# Patient Record
Sex: MALE | Race: WHITE
[De-identification: names, ages, dates, MRNs, and addresses within clinical notes are randomized per-mention and may not be internally consistent; named-entity substitution may affect disease eponyms.]

---

## 2019-03-16 ENCOUNTER — HOSPITAL ENCOUNTER (EMERGENCY)
Dept: HOSPITAL 63 - ER | Age: 64
Discharge: HOME | End: 2019-03-16
Payer: OTHER GOVERNMENT

## 2019-03-16 VITALS — BODY MASS INDEX: 27.17 KG/M2 | HEIGHT: 69 IN | WEIGHT: 183.42 LBS

## 2019-03-16 VITALS — DIASTOLIC BLOOD PRESSURE: 67 MMHG | SYSTOLIC BLOOD PRESSURE: 126 MMHG

## 2019-03-16 DIAGNOSIS — B97.89: ICD-10-CM

## 2019-03-16 DIAGNOSIS — Z87.442: ICD-10-CM

## 2019-03-16 DIAGNOSIS — J32.0: Primary | ICD-10-CM

## 2019-03-16 PROCEDURE — 99283 EMERGENCY DEPT VISIT LOW MDM: CPT

## 2019-03-16 NOTE — PHYS DOC
Past History


Past Medical History:  Kidney Stones, Other


Past Surgical History:  Other


Alcohol Use:  Rarely


Drug Use:  None





Adult General


Chief Complaint


Chief Complaint:  sinus pressure





HPI


HPI





Patient is a 63 year old male who presents with complaint of bilateral sinus 

pressure.  Patient has had congestion over the past 5 days.  Notes previous 

history of chronic sinusitis symptoms.  Patient states that he has had 

bilateral maxillary sinus surgery 9 months ago but has started to have 

recurrent sinus congestion off and on over the past 3 months.  Has been using 

oral decongestants and Mucinex with no significant relief of symptoms.  Denies 

any fever.  Has used Afrin at night time to help with sleep over the past 5 

nights.  States symptoms improved during the day but are worse at nighttime.  

Denies any cough, shortness of breath.  Has had postnasal drip and sore throat.





Review of Systems


Review of Systems





Constitutional: Denies fever or chills []


Eyes: Denies change in visual acuity, redness, or eye pain []


HENT: Nasal congestion, sinus pressure, sore throat, postnasal drip[]


Respiratory: Denies cough or shortness of breath []


Cardiovascular: Denies chest pain or edema[]


GI: Denies abdominal pain, nausea, vomiting, bloody stools or diarrhea []


: Denies dysuria or hematuria []


Musculoskeletal: Denies back pain or joint pain []


Integument: Denies rash or skin lesions []


Neurologic: Denies headache, focal weakness or sensory changes []








All other systems were reviewed and found to be within normal limits, except as 

documented in this note.





Allergies


Allergies





Allergies








Coded Allergies Type Severity Reaction Last Updated Verified


 


  No Known Drug Allergies    6/16/16 No











Physical Exam


Physical Exam





Constitutional: Well developed, well nourished, no acute distress, non-toxic 

appearance. []


HENT: Normocephalic, atraumatic, bilateral external ears normal, oropharynx 

moist, no oral exudates, nasal mucosal dryness bilaterally, mild nasal mucosal 

edema, maxillary sinuses nontender on palpation. []


Eyes: PERRLA, EOMI, conjunctiva normal, no discharge. [] 


Neck: Normal range of motion, no tenderness, supple, no stridor. [] 


Cardiovascular:Heart rate regular rhythm, no murmur []


Lungs & Thorax:  Bilateral breath sounds clear to auscultation []


Abdomen: Bowel sounds normal, soft, no tenderness, no masses, no pulsatile 

masses. [] 


Skin: Warm, dry, no erythema, no rash. [] 


Back: No tenderness, no CVA tenderness. [] 


Extremities: No tenderness, no cyanosis, no clubbing, ROM intact, no edema. [] 


Neurologic: Alert and oriented X 3, normal motor function, normal sensory 

function, no focal deficits noted. []





Current Patient Data


Vital Signs





 Vital Signs








  Date Time  Temp Pulse Resp B/P (MAP) Pulse Ox O2 Delivery O2 Flow Rate FiO2


 


3/16/19 08:49      Room Air  


 


3/16/19 08:49 97.6 66 20  96   








Lab Results


Not performed





EKG


EKG


Not performed[]





Radiology/Procedures


Radiology/Procedures


Not performed[]





Course & Med Decision Making


Course & Med Decision Making


Pertinent Labs and Imaging studies reviewed. (See chart for details)





Patient's exam consistent with nonbacterial bilateral maxillary sinusitis.  At 

this time antibiotic therapy is not indicated.  Advised use of Flonase nasal 

spray and use of Claritin daily to add current regimen.  Advised to discontinue 

use of Afrin and advised limited dose of oral decongestants.  Recommended use 

of warm moist air and nighttime humidifier for continued treatment of 

congestion.  Recommended follow-up in 3-5 days with primary doctor for 

reevaluation and return to emergency department for any worsening symptoms.  

Patient was understanding and in agreement with treatment plan.[]





Dragon Disclaimer


Dragon Disclaimer


This electronic medical record was generated, in whole or in part, using a 

voice recognition dictation system.





Departure


Departure:


Impression:  


 Primary Impression:  


 Viral sinusitis


Disposition:  01 HOME, SELF-CARE


Condition:  STABLE


Referrals:  


LAKESHA RENDON DO (PCP)


Patient Instructions:  Sinusitis





Additional Instructions:  


Follow up with your primary doctor in 5 days for reevaluation. Return to the 

emergency department for  any worsening symptoms.


Scripts


Fluticasone Propionate (Flonase Allergy Relief) 9.9 Ml [place].susp


2 SPRAYS NS DAILY for 30 Days, BOTTLE


   Prov: TOSHIA STANTON MD         3/16/19











TOSHIA STANTON MD Mar 16, 2019 09:16

## 2020-12-12 ENCOUNTER — HOSPITAL ENCOUNTER (EMERGENCY)
Dept: HOSPITAL 63 - ER | Age: 65
Discharge: HOME | End: 2020-12-12
Payer: OTHER GOVERNMENT

## 2020-12-12 VITALS — BODY MASS INDEX: 26.71 KG/M2 | WEIGHT: 180.34 LBS | HEIGHT: 69 IN

## 2020-12-12 VITALS
SYSTOLIC BLOOD PRESSURE: 143 MMHG | DIASTOLIC BLOOD PRESSURE: 87 MMHG | DIASTOLIC BLOOD PRESSURE: 87 MMHG | SYSTOLIC BLOOD PRESSURE: 143 MMHG

## 2020-12-12 DIAGNOSIS — R00.1: ICD-10-CM

## 2020-12-12 DIAGNOSIS — G89.29: ICD-10-CM

## 2020-12-12 DIAGNOSIS — Z87.442: ICD-10-CM

## 2020-12-12 DIAGNOSIS — N20.0: Primary | ICD-10-CM

## 2020-12-12 LAB
ALBUMIN SERPL-MCNC: 3.8 G/DL (ref 3.4–5)
ALP SERPL-CCNC: 74 U/L (ref 46–116)
ALT SERPL-CCNC: 38 U/L (ref 16–63)
ANION GAP SERPL CALC-SCNC: 8 MMOL/L (ref 6–14)
APTT PPP: YELLOW S
AST SERPL-CCNC: 25 U/L (ref 15–37)
BACTERIA #/AREA URNS HPF: 0 /HPF
BASOPHILS # BLD AUTO: 0 X10^3/UL (ref 0–0.2)
BASOPHILS NFR BLD: 1 % (ref 0–3)
BILIRUB DIRECT SERPL-MCNC: 0.1 MG/DL (ref 0–0.2)
BILIRUB SERPL-MCNC: 0.3 MG/DL (ref 0.2–1)
BILIRUB UR QL STRIP: (no result)
CA-I SERPL ISE-MCNC: 21 MG/DL (ref 8–26)
CALCIUM SERPL-MCNC: 8.6 MG/DL (ref 8.5–10.1)
CHLORIDE SERPL-SCNC: 107 MMOL/L (ref 98–107)
CO2 SERPL-SCNC: 27 MMOL/L (ref 21–32)
CREAT SERPL-MCNC: 1.2 MG/DL (ref 0.7–1.3)
EOSINOPHIL NFR BLD: 0.5 X10^3/UL (ref 0–0.7)
EOSINOPHIL NFR BLD: 6 % (ref 0–3)
ERYTHROCYTE [DISTWIDTH] IN BLOOD BY AUTOMATED COUNT: 13.2 % (ref 11.5–14.5)
FIBRINOGEN PPP-MCNC: (no result) MG/DL
GFR SERPLBLD BASED ON 1.73 SQ M-ARVRAT: 61 ML/MIN
GLUCOSE SERPL-MCNC: 120 MG/DL (ref 70–99)
GLUCOSE UR STRIP-MCNC: (no result) MG/DL
HCT VFR BLD CALC: 48.2 % (ref 39–53)
HGB BLD-MCNC: 16.1 G/DL (ref 13–17.5)
LIPASE: 99 U/L (ref 73–393)
LYMPHOCYTES # BLD: 2.1 X10^3/UL (ref 1–4.8)
LYMPHOCYTES NFR BLD AUTO: 27 % (ref 24–48)
MCH RBC QN AUTO: 30 PG (ref 25–35)
MCHC RBC AUTO-ENTMCNC: 34 G/DL (ref 31–37)
MCV RBC AUTO: 91 FL (ref 79–100)
MONO #: 0.7 X10^3/UL (ref 0–1.1)
MONOCYTES NFR BLD: 9 % (ref 0–9)
NEUT #: 4.5 X10^3UL (ref 1.8–7.7)
NEUTROPHILS NFR BLD AUTO: 57 % (ref 31–73)
NITRITE UR QL STRIP: (no result)
PLATELET # BLD AUTO: 219 X10^3/UL (ref 140–400)
POTASSIUM SERPL-SCNC: 3.7 MMOL/L (ref 3.5–5.1)
PROT SERPL-MCNC: 7.4 G/DL (ref 6.4–8.2)
RBC # BLD AUTO: 5.31 X10^6/UL (ref 4.3–5.7)
RBC #/AREA URNS HPF: 0 /HPF (ref 0–2)
SODIUM SERPL-SCNC: 142 MMOL/L (ref 136–145)
SP GR UR STRIP: >=1.03
SQUAMOUS #/AREA URNS LPF: (no result) /LPF
UROBILINOGEN UR-MCNC: 0.2 MG/DL
WBC # BLD AUTO: 7.9 X10^3/UL (ref 4–11)
WBC #/AREA URNS HPF: 0 /HPF (ref 0–4)

## 2020-12-12 PROCEDURE — 80048 BASIC METABOLIC PNL TOTAL CA: CPT

## 2020-12-12 PROCEDURE — 80076 HEPATIC FUNCTION PANEL: CPT

## 2020-12-12 PROCEDURE — 81001 URINALYSIS AUTO W/SCOPE: CPT

## 2020-12-12 PROCEDURE — 99285 EMERGENCY DEPT VISIT HI MDM: CPT

## 2020-12-12 PROCEDURE — 85730 THROMBOPLASTIN TIME PARTIAL: CPT

## 2020-12-12 PROCEDURE — 85610 PROTHROMBIN TIME: CPT

## 2020-12-12 PROCEDURE — 83690 ASSAY OF LIPASE: CPT

## 2020-12-12 PROCEDURE — 74176 CT ABD & PELVIS W/O CONTRAST: CPT

## 2020-12-12 PROCEDURE — 74022 RADEX COMPL AQT ABD SERIES: CPT

## 2020-12-12 PROCEDURE — 96374 THER/PROPH/DIAG INJ IV PUSH: CPT

## 2020-12-12 PROCEDURE — 96361 HYDRATE IV INFUSION ADD-ON: CPT

## 2020-12-12 PROCEDURE — 96375 TX/PRO/DX INJ NEW DRUG ADDON: CPT

## 2020-12-12 PROCEDURE — 36415 COLL VENOUS BLD VENIPUNCTURE: CPT

## 2020-12-12 PROCEDURE — 82550 ASSAY OF CK (CPK): CPT

## 2020-12-12 PROCEDURE — 85025 COMPLETE CBC W/AUTO DIFF WBC: CPT

## 2020-12-12 NOTE — PHYS DOC
Past History


Past Medical History:  Kidney Stones, Other


 (AMY HUGO MD)


Past Surgical History:  Other


 (AMY HUGO MD)


Alcohol Use:  Rarely


Drug Use:  None


 (AMY HUGO MD)





General Adult


HPI:


HPI:


".. I am having another kidneystone...this is my fourth one... It been hurting 

the last 8 hrs.. right side... I ve had  stents.. .. basket.. and lithotripsy...

This happens about every 5 years... " " I like that Toradol drug.... And that 

Demerol drug.. Do not like that morphine drug.. "





Patient is a 65. year old male who presents with above hx and complaints of 

kidney stone pain right flank for the past 8 hours.  Is having associated 

nausea.  Patient is retired .


 (AMY HUGO MD)





Review of Systems:


Review of Systems:


Constitutional:  Denies fever or chills 


Eyes:  Denies change in visual acuity 


HENT:  Denies nasal congestion or sore throat 


Respiratory:  Denies cough or shortness of breath 


Cardiovascular:  Denies chest pain or edema 


GI:  Denies abdominal pain, nausea, vomiting, bloody stools or diarrhea 


: Denies dysuria 


Musculoskeletal:  Denies back pain or joint pain 


Integument:  Denies rash 


Neurologic:  Denies headache, focal weakness or sensory changes 


Endocrine:  Denies polyuria or polydipsia 


Lymphatic:  Denies swollen glands 


Psychiatric:  Denies depression or anxiety


 (AMY HUGO MD)





Allergies:


Allergies:





Allergies








Coded Allergies Type Severity Reaction Last Updated Verified


 


  No Known Drug Allergies    16 No








 (AMY HUGO MD)





Physical Exam:


PE:





Constitutional: Well developed, well nourished, no acute distress, non-toxic 

appearance. []


HENT: Normocephalic, atraumatic, bilateral external ears normal, oropharynx 

moist, no oral exudates, nose normal. []


Eyes: PERRLA, EOMI, conjunctiva normal, no discharge. [] 


Neck: Normal range of motion, no tenderness, supple, no stridor. [] 


Cardiovascular:Heart rate regular rhythm, no murmur []


Lungs & Thorax:  Bilateral breath sounds clear to auscultation []


Abdomen: Bowel sounds normal, soft, no tenderness, no masses, no pulsatile 

masses. [] 


Skin: Warm, dry, no erythema, no rash. [] 


Back: No tenderness, no CVA tenderness. [] 


Extremities: No tenderness, no cyanosis, no clubbing, ROM intact, no edema. [] 


Neurologic: Alert and oriented X 3, normal motor function, normal sensory 

function, no focal deficits noted. []


Psychologic: Affect normal, judgement normal, mood normal. []


 (AMY HUGO MD)





EKG:


EKG:


[]


 (AMY HUGO MD)





Radiology/Procedures:


Radiology/Procedures:


[]


 (AMY HUGO MD)


Radiology/Procedures:


                                 IMAGING REPORT





                                     Signed





PATIENT: LAKESHA SEAY  ACCOUNT: [id number]     MRN#: [id number]


: 1955           LOCATION: ER              AGE: 65


SEX: M                    EXAM DT: 20         ACCESSION#: [id number]


STATUS: REG ER            ORD. PHYSICIAN: AMY HUGO MD


REASON: Rt. flank pain


PROCEDURE: ACUTE ABDOMEN SERIES





EXAMINATION: XR ABDOMEN COMP ACUTE  





CLINICAL HISTORY: Right flank pain





TECHNIQUE: XR ABDOMEN COMP ACUTE





COMPARISON: None








FINDINGS:  





Nonspecific nonobstructive bowel gas pattern extending to the rectum. Stool 

throughout the colon. Small left renal calculus. No right renal calculus 

visualized.





Normal heart size. No evidence of focal airspace consolidation, pleural 

effusion, or pneumothorax.





Multilevel thoracolumbar degenerative changes. Suture anchors in the right 

shoulder.








IMPRESSION:  





No evidence of acute abdominopelvic or cardiopulmonary abnormality.





Small left renal calculus.





Stool throughout the colon.








Electronically signed by: Dariusz Solano DO (2020 8:02 AM) [facility]














DICTATED AND SIGNED BY:     DARIUSZ SOLANO DO


DATE:     20 0800





CC: LAKESHA RENDON DO; AMY HUGO MD; NICOLAS HERNANDEZ DO ~MTH0 0


                                        


                                 IMAGING REPORT





                                     Signed





PATIENT: LAKESHA SEAY  ACCOUNT: [id number]     MRN#: [id number]


: 1955           LOCATION: ER              AGE: 65


SEX: M                    EXAM DT: 20         ACCESSION#: [id number]


STATUS: REG ER            ORD. PHYSICIAN: AMY HUGO MD


REASON: Rt. flank.. pain


PROCEDURE: CT ABDOMEN PELVIS WO CONTRAST





EXAMINATION: CT ABDOMEN+PELVIS WO (CT ABDOMEN/PELVIS WITHOUT IV CONTRAST)





CLINICAL HISTORY: Right flank pain





TECHNIQUE: Non-IV contrast imaging of the abdomen and pelvis was performed using

 standard technique, scanning from just above the dome of the diaphragm to the 

symphysis pubis.  Unenhanced imaging is limited for the evaluation of some 

intra-abdominal and pelvic pathology.





CT Dose Reduction Employed: One or more of the following individualized dose red

uction techniques were utilized for this examination:  1. Automated exposure 

control  2. Adjustment of the mA and/or kV according to patient size  3. Use of 

iterative reconstruction technique.





COMPARISON: Acute abdomen series same day, CT abdomen/pelvis 2016








FINDINGS:





Partially visualized coronary atherosclerotic calcification. Minimal dependent 

bibasilar subsegmental atelectasis.





Liver, gallbladder, pancreas, spleen, and adrenal glands unremarkable.





No right nephrolithiasis. 5 mm nonobstructive calculus in the lower pole of the 

left kidney. No ureteral or bladder calculi. No hydronephrosis. 1.4 cm cortical 

cyst upper pole of the right kidney. Moderately filled urinary bladder 

unremarkable. Central prostatic ossifications.





No dilated bowel. Evaluation for bowel wall thickening limited by lack of 

intravenous contrast. Moderate stool throughout the colon. Normal air-filled 

appendix.





Arterial atherosclerotic calcification without aneurysm. Multiple prominent but 

nonenlarged mesenteric lymph nodes, nonspecific.





Multilevel thoracolumbar degenerative changes. 








IMPRESSION:





No evidence of acute abdominopelvic abnormality.





Nonobstructive left renal calculus, otherwise no evidence of urolithiasis or 

obstructive uropathy.





Moderate stool throughout the colon.





Electronically signed by: Dariusz Solano DO (2020 8:00 AM) [facility]














DICTATED AND SIGNED BY:     DARIUSZ SOLANO DO


DATE:     20 0744





CC: LAKESHA RENDON DO; AMY HUGO MD; NICOLAS HERNANDEZ DO ~MTH0 0


 (NICOLAS HERNANDEZ DO)


Heart Score:


Risk Factors:


Risk Factors:  DM, Current or recent (<one month) smoker, HTN, HLP, family 

history of CAD, obesity.


Risk Scores:


Score 0 - 3:  2.5% MACE over next 6 weeks - Discharge Home


Score 4 - 6:  20.3% MACE over next 6 weeks - Admit for Clinical Observation


Score 7 - 10:  72.7% MACE over next 6 weeks - Early Invasive Strategies


 (AMY HUGO MD)





Course & Med Decision Making:


Course & Med Decision Making


Pertinent Labs and Imaging studies reviewed. (See chart for details)





[]


 (AMY HUGO MD)


Course & Med Decision Making


I received signout from Dr. Hugo at shift change.  65-year-old male past 

medical history of insomnia with complaints of sharp, nonradiating, right flank 

pain that started last night around 9 PM with associated nausea.  Patient 

reports this feels like his typical kidney stone pain-has had 4 prior similar e

pisodes of nephrolithiasis.  Last kidney stone was in  patient was seen in 

the ED-had a 7 mm proximal left ureteral nonobstructing stone (mild aortoiliac 

calcified atherosclerosis was seen) at that time and followed up outpatient with

 urology.  Reports in  he had to have ureteral stents placed.  Has also 

received lithotripsy in the past. Pt denies any fever/chills, urinary sxs 

(dysuria/hematuria/frequency/urgency), vomiting, neuro deficits or 

abdominal/pelvic pain.





Imaging not consistent with any renal colic, no nephrolithiasis.  Descending 

thoracic aorta and abdominal aorta with normal diameters in 2 dimensions.  Labs 

unremarkable.  Patient is afebrile. HR 55. U/A with no infection, RBCs or 

hematuria. Pt calm appearing, not writhing, in no distress.  Vital signs stable.

  Does have asymptomatic bradycardia.  Will dc home with strict ED return 

precautions were given for severe chest or back pain, neurologic deficits or 

syncope. Encouraged urgent outpatient follow-up with PMD, cardiology and 

urology.  Life-threatening processes were considered but are low suspicion at 

this time, given history and physical exam. Pt was educated on all prescription 

medications and adverse effects.  All patient's questions were answered and pt 

was stable at time of discharge.





Life/limb-threatening differential includes but is not limited to, aortic 

dissection/aneurysm, cauda equina syndrome, transverse myelitis, spinal cord 

compression, epidural abscess or hematoma, osteomyelitis, disc herniation, 

surgical abdomen, stable or unstable fracture, renal/ureteral colic, sepsis, 

musculoskeletal injury, traumatic injury, intraabdominal or pelvic bleeding.





I spoken with the patient and her caregivers.  I explained the patient's 

condition, diagnoses and treatment plan based on the information available to me

 at this time.  I have answered the patient and her caregiver's questions and 

addressed any concerns.  The patient and her caregivers have a good 

understanding of patient's diagnosis, condition and treatment plan as can be 

expected at this point.  Vital signs have been stable.  Patient's condition is 

stable and appropriate for discharge from the emergency department. 





Patient will pursue further outpatient evaluation with primary care physician or

 other designated or consulting physician as outlined in the discharge 

instructions.  The patient and/or caregivers are agreeable to this plan of care 

and follow-up instructions have been explained in detail.  The patient and/or 

caregivers have received these instructions in written form and have expressed 

an understanding of the discharge instructions.  The patient and/or caregivers 

are aware that any significant change of condition or worsening of symptoms 

should prompt immediate return to this or the closest emergency department or 

call to 911.


 (NICOLAS HERNANDEZ DO)


Dragon Disclaimer:


Dragon Disclaimer:


This electronic medical record was generated, in whole or in part, using a voice

 recognition dictation system.


 (AMY HUGO MD)





Departure


Departure:


Impression:  


   Primary Impression:  


   Right flank pain, chronic


   Additional Impressions:  


   Left nephrolithiasis


   Sinus bradycardia


Disposition:  01 DC HOME SELF CARE/HOMELESS


Condition:  STABLE


Referrals:  


LAKESHA RENDON DO (PCP)


in 1-2 weeks


Patient Instructions:  Bradycardia, Diet for Kidney Stones, Flank Pain





Additional Instructions:  


FOLLOW WITH UROLOGY:





[facility]


[address] [place] [address]


[phone]





OR





[facility]


[address]


[place] [address] [phone]





FOLLOW UP WITH CARDIOLOGY:





[facility]


[address]


[place] [address]


[phone]





OR 





[facility]


[address] [address]





EMERGENCY DEPARTMENT GENERAL DISCHARGE INSTRUCTIONS





Thank you for coming to [facility] (ED) today and trusting us

 with you 


care.  We trust that you had a positivie experience in our Emergency Department.

  If you 


wish to speak to the department management, you may call the director at 

[phone].





YOUR FOLLOW UP INSTRUCTIONS ARE AS FOLLOWS:





1.  Do you have a private Doctor?  If you do not have a private doctor, please 

ask for a 


resource list of physicians or clinics that may be able to assist you with 

follow up care.





2.  The Emergency Physician has interpreted your x-rays.  The X-Ray specialist 

will also 


review them.  If there is a change in the findings, you will be notified in 48 

hours when at 


all possible.





3.  A lab test or culture has been done, your results will be reviewed and you 

will be 


notified if you need a change in treatment.





ADDITIONAL INSTRUCTIONS AND INFORMATION:





1.  Your care today has been supervised by a physician who is specially trained 

in emergency 


care.  Many problems require more than one evaluation for a complete diagnosis 

and 


treatment.  We recommend that you schedule your follow up appointment as 

recommended to 


ensure complete treatment of you illness or injury.  If you are unable to obtain

 follow up 


care and continue to have a problem, or if your condition worsens, we recommend 

that you 


return to the ED.





2.  We are not able to safely determine your condition over the phone nor are we

 able to 


give sound medical advice over the phone.  For these safety reasons, if you call

 for medical 


advice we will ask you to come to the ED for further evaluation.





3.  If you have any questions regarding these discharge instructions please call

 the ED at 


[phone].





SAFETY INFORMATION:





In the interest of safety, wellness, and injury prevention; we encourage you to 

wear your 


sealbelt, if you smoke; quite smoking, and we encourage family to use a 

protective helmet 


for bicycling and other sporting events that present an increased risk for head 

injury.





IF YOUR SYMPTOMS WORSEN OR NEW SYMPTOMS DEVELOP, OR YOU HAVE CONCERNS ABOUT YOUR

 CONDITION; 


OR IF YOUR CONDITION WORSENS WHILE YOU ARE WAITING FOR YOUR FOLLOW UP 

APPOINTMENT; EITHER 


CONTACT YOUR PRIMARY CARE DOCTOR, THE PHYSICIAN WHOSE NAME AND NUMBER YOU WERE 

GIVEN, OR 


RETURN TO THE ED IMMEDIATELY.











AMY HUGO MD           Dec 12, 2020 06:00


NICOLAS HERNANDEZ DO               Dec 12, 2020 06:31

## 2020-12-12 NOTE — RAD
EXAMINATION: CT ABDOMEN+PELVIS WO (CT ABDOMEN/PELVIS WITHOUT IV CONTRAST)



CLINICAL HISTORY: Right flank pain



TECHNIQUE: Non-IV contrast imaging of the abdomen and pelvis was performed using standard technique, 
scanning from just above the dome of the diaphragm to the symphysis pubis.  Unenhanced imaging is storm
ited for the evaluation of some intra-abdominal and pelvic pathology.



CT Dose Reduction Employed: One or more of the following individualized dose reduction techniques wer
e utilized for this examination:  1. Automated exposure control  2. Adjustment of the mA and/or kV ac
cording to patient size  3. Use of iterative reconstruction technique.



COMPARISON: Acute abdomen series same day, CT abdomen/pelvis 7/20/2016





FINDINGS:



Partially visualized coronary atherosclerotic calcification. Minimal dependent bibasilar subsegmental
 atelectasis.



Liver, gallbladder, pancreas, spleen, and adrenal glands unremarkable.



No right nephrolithiasis. 5 mm nonobstructive calculus in the lower pole of the left kidney. No urete
ral or bladder calculi. No hydronephrosis. 1.4 cm cortical cyst upper pole of the right kidney. Moder
ately filled urinary bladder unremarkable. Central prostatic ossifications.



No dilated bowel. Evaluation for bowel wall thickening limited by lack of intravenous contrast. Moder
ate stool throughout the colon. Normal air-filled appendix.



Arterial atherosclerotic calcification without aneurysm. Multiple prominent but nonenlarged mesenteri
c lymph nodes, nonspecific.



Multilevel thoracolumbar degenerative changes. 





IMPRESSION:



No evidence of acute abdominopelvic abnormality.



Nonobstructive left renal calculus, otherwise no evidence of urolithiasis or obstructive uropathy.



Moderate stool throughout the colon.



Electronically signed by: Dariusz Gardner DO (12/12/2020 8:00 AM) [facility]

## 2020-12-12 NOTE — RAD
EXAMINATION: XR ABDOMEN COMP ACUTE  



CLINICAL HISTORY: Right flank pain



TECHNIQUE: XR ABDOMEN COMP ACUTE



COMPARISON: None





FINDINGS:  



Nonspecific nonobstructive bowel gas pattern extending to the rectum. Stool throughout the colon. Sma
ll left renal calculus. No right renal calculus visualized.



Normal heart size. No evidence of focal airspace consolidation, pleural effusion, or pneumothorax.



Multilevel thoracolumbar degenerative changes. Suture anchors in the right shoulder.





IMPRESSION:  



No evidence of acute abdominopelvic or cardiopulmonary abnormality.



Small left renal calculus.



Stool throughout the colon.





Electronically signed by: Dariusz Gardner DO (12/12/2020 8:02 AM) [facility]

## 2021-01-11 ENCOUNTER — HOSPITAL ENCOUNTER (OUTPATIENT)
Dept: HOSPITAL 63 - LAB | Age: 66
End: 2021-01-11
Attending: UROLOGY
Payer: OTHER GOVERNMENT

## 2021-01-11 DIAGNOSIS — Z20.828: Primary | ICD-10-CM

## 2021-01-11 PROCEDURE — U0003 INFECTIOUS AGENT DETECTION BY NUCLEIC ACID (DNA OR RNA); SEVERE ACUTE RESPIRATORY SYNDROME CORONAVIRUS 2 (SARS-COV-2) (CORONAVIRUS DISEASE [COVID-19]), AMPLIFIED PROBE TECHNIQUE, MAKING USE OF HIGH THROUGHPUT TECHNOLOGIES AS DESCRIBED BY CMS-2020-01-R: HCPCS

## 2022-05-03 ENCOUNTER — HOSPITAL ENCOUNTER (OUTPATIENT)
Dept: HOSPITAL 61 - KCIC CT | Age: 67
End: 2022-05-03
Payer: COMMERCIAL

## 2022-05-03 DIAGNOSIS — I77.819: ICD-10-CM

## 2022-05-03 DIAGNOSIS — E78.00: ICD-10-CM

## 2022-05-03 DIAGNOSIS — I25.10: Primary | ICD-10-CM

## 2022-05-03 PROCEDURE — 75571 CT HRT W/O DYE W/CA TEST: CPT

## 2022-05-03 NOTE — [facility]
Coronary artery calcium score dated May 3, 2022.



No comparison available.



Clinical Indication: Hypercholesterolemia. Calcium screening.



Technical factors:



High resolution, computed tomography of the heart was performed with ECG gating and suspended respira
tion. No contrast material was administered. Post processing was performed on the 3-D computer workst
ation using diastolic phase images to measure the amount of coronary vascular calcium. Calcium scorin
g was performed utilizing the Agatston Method.



PQRS compliance Statement



One or more of the following individualized dose reduction techniques were utilized for this study:

1.  Automated exposure control

2.  Adjustment of the mA and/or kV according to patient size

3.  Use of iterative reconstruction technique



Results:



Thorax:

There is ectasia of the ascending aorta measuring up to 4 cm in greatest caliber. No other significan
t abnormality.



Coronary arteries:



Left main coronary artery: 96.8

Left anterior descending coronary artery: 174.2

Left circumflex coronary artery: 123.8

Right coronary artery: 113.1





Total coronary artery calcium score: 507.9





Information is based on an analysis of the coronary arteries only. Calcium deposits do not correspond
 directly to the percentage of narrowing of the arteries. They do correlate directly to the amount of
 coronary artery plaque and to the risk of future coronary artery disease. These calcium deposits usu
ally begin to form years before any symptoms develop. Early detection and modification of risk factor
s, such as smoking and cholesterol intake, can slow the progress of coronary artery disease.



The results should be discussed with your physician taking into account other risk factors such as ag
e, gender, family history, diabetes, smoking or high cholesterol levels.



Should you ever experience chest pain, difficulty breathing, discomfort radiating into your neck or a
rm, or discomfort combined with lightheadedness, sweating, fainting or nausea, you should seek prompt
 medical attention. 



A high calcium score may be consistent with a moderate to high risk of cardiovascular events within t
he 2-5 years.



Conclusions:

1. Coronary atherosclerosis is present, extensive. 

2. High risk of cardiovascular event within the next 2 to 5 years.

3. High probability of stenotic (potentially flow-limiting)

or occlusive coronary artery disease.

4. Ectasia of the ascending aorta measuring up to 4 cm in greatest caliber.



Recommendations:



1. Strongly consider further cardiac evaluation for pre-clinical coronary heart disease. 

2. [place] aggressive cardiovascular risk factor modification as indicated based on risk profile.

***Additional supporting information concerning the findings and recommendation contained within this
 report can be found in the consensus statements on coronary vascular calcium published by the Americ
an Heart Association and American College of Cardiology and Prevention 5 Conference (Circulation 1996
; 94: 6349-8518; J Am Selvin Cardiol 2000; 36: 326-340 and Circulation 2000; 101: 111-116).



Electronically signed by: Edgar Thakkar MD (5/3/2022 9:53 AM) [id number]